# Patient Record
Sex: MALE
[De-identification: names, ages, dates, MRNs, and addresses within clinical notes are randomized per-mention and may not be internally consistent; named-entity substitution may affect disease eponyms.]

---

## 2020-09-29 ENCOUNTER — DASHBOARD ENCOUNTERS (OUTPATIENT)
Age: 35
End: 2020-09-29

## 2020-09-29 ENCOUNTER — OFFICE VISIT (OUTPATIENT)
Dept: URBAN - METROPOLITAN AREA CLINIC 98 | Facility: CLINIC | Age: 35
End: 2020-09-29
Payer: COMMERCIAL

## 2020-09-29 VITALS
HEIGHT: 67 IN | TEMPERATURE: 98.4 F | WEIGHT: 246.6 LBS | BODY MASS INDEX: 38.71 KG/M2 | HEART RATE: 81 BPM | DIASTOLIC BLOOD PRESSURE: 85 MMHG | SYSTOLIC BLOOD PRESSURE: 124 MMHG

## 2020-09-29 DIAGNOSIS — K92.1 RECTAL BLEEDING: ICD-10-CM

## 2020-09-29 DIAGNOSIS — K59.00 ACUTE CONSTIPATION: ICD-10-CM

## 2020-09-29 PROBLEM — 275978004 COLON CANCER SCREENING: Status: ACTIVE | Noted: 2020-09-29

## 2020-09-29 PROBLEM — 14760008 CONSTIPATION: Status: ACTIVE | Noted: 2020-09-29

## 2020-09-29 PROBLEM — 197119006 ACUTE CONSTIPATION: Status: ACTIVE | Noted: 2020-09-29

## 2020-09-29 PROCEDURE — 99204 OFFICE O/P NEW MOD 45 MIN: CPT | Performed by: INTERNAL MEDICINE

## 2020-09-29 PROCEDURE — 1036F TOBACCO NON-USER: CPT | Performed by: INTERNAL MEDICINE

## 2020-09-29 PROCEDURE — G8417 CALC BMI ABV UP PARAM F/U: HCPCS | Performed by: INTERNAL MEDICINE

## 2020-09-29 PROCEDURE — G8427 DOCREV CUR MEDS BY ELIG CLIN: HCPCS | Performed by: INTERNAL MEDICINE

## 2020-09-29 RX ORDER — DOCUSATE SODIUM 100 MG/1
1 CAPSULE AS NEEDED CAPSULE ORAL ONCE A DAY
Status: ACTIVE | COMMUNITY

## 2020-09-29 NOTE — HPI-TODAY'S VISIT:
Mr. Oswald is a 36 yo M presenting with constipation. He has had constipation for about 1 month. He was eating a lot of cheese for a week and then skipped a day or two of bowel movements. He was straining and had a significant amount of blood pass per rectum. He saw a physician for this and was started on a stool softener (Colace) and began having a BM daily. He has continued to have intermittent bleeding, sometimes heavy with bowel movements. He has no abdominal pain, nausea or vomiting. He was also taking miralax and then stopped 2 weeks ago and has not needed it as he has had a BM daily since that time. He has had no unintentional weight loss.

## 2020-09-30 LAB
HEMATOCRIT: 48
HEMOGLOBIN: 15.6
MCH: 29.2
MCHC: 32.5
MCV: 90
NRBC: (no result)
PLATELETS: 205
RBC: 5.34
RDW: 13.1
WBC: 5

## 2020-10-01 ENCOUNTER — CLAIMS CREATED FROM THE CLAIM WINDOW (OUTPATIENT)
Dept: URBAN - METROPOLITAN AREA CLINIC 4 | Facility: CLINIC | Age: 35
End: 2020-10-01
Payer: COMMERCIAL

## 2020-10-01 ENCOUNTER — OFFICE VISIT (OUTPATIENT)
Dept: URBAN - METROPOLITAN AREA SURGERY CENTER 18 | Facility: SURGERY CENTER | Age: 35
End: 2020-10-01
Payer: COMMERCIAL

## 2020-10-01 DIAGNOSIS — K62.1 DYSPLASTIC POLYP OF RECTUM: ICD-10-CM

## 2020-10-01 DIAGNOSIS — D12.8 BENIGN NEOPLASM OF RECTUM: ICD-10-CM

## 2020-10-01 DIAGNOSIS — K92.1 BLACK STOOL: ICD-10-CM

## 2020-10-01 PROCEDURE — G9937 DIG OR SURV COLSCO: HCPCS | Performed by: INTERNAL MEDICINE

## 2020-10-01 PROCEDURE — 88305 TISSUE EXAM BY PATHOLOGIST: CPT | Performed by: PATHOLOGY

## 2020-10-01 PROCEDURE — 45380 COLONOSCOPY AND BIOPSY: CPT | Performed by: INTERNAL MEDICINE

## 2020-10-01 PROCEDURE — G8907 PT DOC NO EVENTS ON DISCHARG: HCPCS | Performed by: INTERNAL MEDICINE
